# Patient Record
Sex: MALE | Race: WHITE | NOT HISPANIC OR LATINO | Employment: FULL TIME | URBAN - METROPOLITAN AREA
[De-identification: names, ages, dates, MRNs, and addresses within clinical notes are randomized per-mention and may not be internally consistent; named-entity substitution may affect disease eponyms.]

---

## 2024-06-21 ENCOUNTER — HOSPITAL ENCOUNTER (EMERGENCY)
Facility: HOSPITAL | Age: 34
Discharge: HOME | End: 2024-06-21
Payer: COMMERCIAL

## 2024-06-21 VITALS
DIASTOLIC BLOOD PRESSURE: 109 MMHG | RESPIRATION RATE: 20 BRPM | BODY MASS INDEX: 30.8 KG/M2 | SYSTOLIC BLOOD PRESSURE: 139 MMHG | HEIGHT: 71 IN | HEART RATE: 117 BPM | WEIGHT: 220 LBS | TEMPERATURE: 97.2 F | OXYGEN SATURATION: 99 %

## 2024-06-21 DIAGNOSIS — S61.411A LACERATION OF RIGHT HAND WITHOUT FOREIGN BODY, INITIAL ENCOUNTER: Primary | ICD-10-CM

## 2024-06-21 PROCEDURE — 90715 TDAP VACCINE 7 YRS/> IM: CPT | Performed by: PHYSICIAN ASSISTANT

## 2024-06-21 PROCEDURE — 2500000004 HC RX 250 GENERAL PHARMACY W/ HCPCS (ALT 636 FOR OP/ED): Performed by: PHYSICIAN ASSISTANT

## 2024-06-21 PROCEDURE — 12002 RPR S/N/AX/GEN/TRNK2.6-7.5CM: CPT | Performed by: PHYSICIAN ASSISTANT

## 2024-06-21 PROCEDURE — 90471 IMMUNIZATION ADMIN: CPT | Performed by: PHYSICIAN ASSISTANT

## 2024-06-21 PROCEDURE — 99283 EMERGENCY DEPT VISIT LOW MDM: CPT | Mod: 25

## 2024-06-21 ASSESSMENT — LIFESTYLE VARIABLES
TOTAL SCORE: 0
HAVE YOU EVER FELT YOU SHOULD CUT DOWN ON YOUR DRINKING: NO
EVER HAD A DRINK FIRST THING IN THE MORNING TO STEADY YOUR NERVES TO GET RID OF A HANGOVER: NO
HAVE PEOPLE ANNOYED YOU BY CRITICIZING YOUR DRINKING: NO
EVER FELT BAD OR GUILTY ABOUT YOUR DRINKING: NO

## 2024-06-21 ASSESSMENT — COLUMBIA-SUICIDE SEVERITY RATING SCALE - C-SSRS
1. IN THE PAST MONTH, HAVE YOU WISHED YOU WERE DEAD OR WISHED YOU COULD GO TO SLEEP AND NOT WAKE UP?: NO
6. HAVE YOU EVER DONE ANYTHING, STARTED TO DO ANYTHING, OR PREPARED TO DO ANYTHING TO END YOUR LIFE?: NO
2. HAVE YOU ACTUALLY HAD ANY THOUGHTS OF KILLING YOURSELF?: NO

## 2024-06-21 ASSESSMENT — PAIN SCALES - GENERAL: PAINLEVEL_OUTOF10: 7

## 2024-06-21 ASSESSMENT — PAIN - FUNCTIONAL ASSESSMENT: PAIN_FUNCTIONAL_ASSESSMENT: 0-10

## 2024-06-21 NOTE — ED PROVIDER NOTES
HPI   Chief Complaint   Patient presents with    Laceration       HPI  This is a 34-year-old male who sustained a laceration on the right wrist after cutting himself accidentally when a piece of metal.  Not know when his last tetanus vaccine was.  This happened at home this did not happen at work.  Denies any numbness tingling weakness.  Just presents here for further evaluation.  He states he is ambidextrous.  He denies any other symptoms.                  Lidia Coma Scale Score: 15                     Patient History   No past medical history on file.  No past surgical history on file.  No family history on file.  Social History     Tobacco Use    Smoking status: Not on file    Smokeless tobacco: Not on file   Substance Use Topics    Alcohol use: Not on file    Drug use: Not on file       Physical Exam   ED Triage Vitals   Temp Pulse Resp BP   -- -- -- --      SpO2 Temp src Heart Rate Source Patient Position   -- -- -- --      BP Location FiO2 (%)     -- --       Physical Exam  Family history, social history, and allergies reviewed    Review of systems as noted in history of present illness otherwise negative    Wrist injury:  General: Vitals noted, no distress. Afebrile.  EENT: Eyes unremarkable. Posterior oropharynx unremarkable.  Cardiac: Regular, rate, rhythm, no murmur.  Pulmonary: Lungs clear bilaterally with good aeration. No adventitious breath sounds.    Extremities: No peripheral edema.  Exam of the wrist shows a superficial laceration lying over the inferior portion volar aspect of the right hand.  Extending minimally into the wrist.  Measuring approximately 3 cm in length.  Subcutaneous fat noted.  Full range of motion flexion extension at wrist.   strength +55 and equal. Is neurovascularly intact distally. Is nontender over the hand. The remainder of the extremity is nontender.  Skin: No rash. Capillary refill intact  Neuro: No focal neurologic deficits, NVIT    ED Course & MDM   Boostrix    Diagnoses as of 06/21/24 1649   Laceration of right hand without foreign body, initial encounter   given , see  laceration repair.  See procedure note     Medical Decision Making    Homegoing sutures to remain in place 10 to 14 days.  Return if infected.  Keep clean and dry while out  Procedure  Laceration Repair    Performed by: Preeti Davis PA-C  Authorized by: Preeti Davis PA-C    Consent:     Consent obtained:  Verbal    Consent given by:  Patient  Universal protocol:     Patient identity confirmed:  Verbally with patient and arm band  Laceration details:     Location:  Hand    Hand location:  R wrist    Length (cm):  3  Pre-procedure details:     Preparation:  Patient was prepped and draped in usual sterile fashion  Exploration:     Limited defect created (wound extended): no      Hemostasis achieved with:  Direct pressure    Imaging outcome: foreign body not noted      Wound exploration: wound explored through full range of motion      Wound extent: no muscle damage noted, no nerve damage noted and no tendon damage noted      Contaminated: no    Treatment:     Area cleansed with:  Chlorhexidine    Amount of cleaning:  Extensive    Irrigation solution:  Sterile saline    Irrigation volume:  100 cc    Irrigation method:  Syringe    Debridement:  None    Undermining:  None  Skin repair:     Repair method:  Sutures    Suture size:  4-0    Suture technique:  Horizontal mattress    Number of sutures:  4  Approximation:     Approximation:  Close  Repair type:     Repair type:  Simple  Post-procedure details:     Dressing:  Non-adherent dressing    Procedure completion:  Tolerated well, no immediate complications       Preeti Davis PA-C  06/21/24 4049

## 2024-06-21 NOTE — DISCHARGE INSTRUCTIONS
Sutures to removed 10 to 14 days.  Keep clean and dry while out.  Return if infected.  Use Tylenol for pain as needed

## 2024-06-21 NOTE — ED TRIAGE NOTES
Pt to ER via triage, c/o right wrist laceration. Pt states he was moving some stuff in the basement and cut his wrist against some metal object.